# Patient Record
Sex: MALE | Race: WHITE | NOT HISPANIC OR LATINO | Employment: OTHER | URBAN - METROPOLITAN AREA
[De-identification: names, ages, dates, MRNs, and addresses within clinical notes are randomized per-mention and may not be internally consistent; named-entity substitution may affect disease eponyms.]

---

## 2022-09-08 PROBLEM — N20.0 RENAL STONES: Status: ACTIVE | Noted: 2022-09-08

## 2022-09-08 PROBLEM — N40.0 BPH WITHOUT OBSTRUCTION/LOWER URINARY TRACT SYMPTOMS: Status: ACTIVE | Noted: 2022-09-08

## 2023-02-24 PROBLEM — N28.9 KIDNEY LESION: Status: ACTIVE | Noted: 2023-02-24

## 2023-02-24 PROBLEM — R10.9 ABDOMINAL PAIN: Status: ACTIVE | Noted: 2023-02-24

## 2023-02-24 PROBLEM — N28.1 BILATERAL RENAL CYSTS: Status: ACTIVE | Noted: 2023-02-24

## 2023-05-23 ENCOUNTER — PATIENT MESSAGE (OUTPATIENT)
Dept: RESEARCH | Facility: HOSPITAL | Age: 78
End: 2023-05-23

## 2023-08-14 PROBLEM — N28.89 RENAL MASS OF UNKNOWN NATURE: Status: ACTIVE | Noted: 2023-02-24

## 2023-09-05 PROBLEM — N28.1 COMPLEX RENAL CYST: Status: ACTIVE | Noted: 2023-09-05

## 2024-01-03 PROBLEM — I10 PRIMARY HYPERTENSION: Status: ACTIVE | Noted: 2024-01-03

## 2024-01-03 PROBLEM — E78.2 MIXED HYPERLIPIDEMIA: Status: ACTIVE | Noted: 2024-01-03

## 2024-01-04 PROBLEM — R31.0 GROSS HEMATURIA: Status: ACTIVE | Noted: 2024-01-04

## 2024-01-06 ENCOUNTER — NURSE TRIAGE (OUTPATIENT)
Dept: ADMINISTRATIVE | Facility: CLINIC | Age: 79
End: 2024-01-06

## 2024-01-06 NOTE — TELEPHONE ENCOUNTER
Pt felt good yesterday when he got home from hospital- but today 100.9 fever, nausea, weakness, fatigue, difficulty sleeping.     Dispo-  now. First spoke to Dr. Renee, on call provider for hem/onc. MD stated to call interventional radiology since they did the procedure, or advise to go to ED. First attempt to resident, then to fellow awaiting call back. After no call back received- secure chat message sent to on call fellow, Dr. Sheth with no success. Final attempt to make contact to on call staff physician, Dr. Bolanos with no answer and no option to leave voicemail. Will call pt back and advise to be seen since unable to reach on call specialist. Spoke with pt, advised to be seen now in ED of hospital where he was discharged from now. Pt verbalizes understanding.  Reason for Disposition   Condition / symptoms WORSE    Protocols used: Post-Hospitalization Follow-up Call-A-